# Patient Record
Sex: FEMALE | Race: WHITE | NOT HISPANIC OR LATINO | Employment: UNEMPLOYED | URBAN - METROPOLITAN AREA
[De-identification: names, ages, dates, MRNs, and addresses within clinical notes are randomized per-mention and may not be internally consistent; named-entity substitution may affect disease eponyms.]

---

## 2020-02-11 ENCOUNTER — APPOINTMENT (EMERGENCY)
Dept: RADIOLOGY | Facility: HOSPITAL | Age: 55
End: 2020-02-11

## 2020-02-11 LAB
ALBUMIN SERPL BCP-MCNC: 4.1 G/DL (ref 3.5–5)
ALP SERPL-CCNC: 104 U/L (ref 46–116)
ALT SERPL W P-5'-P-CCNC: 18 U/L (ref 12–78)
ANION GAP SERPL CALCULATED.3IONS-SCNC: 9 MMOL/L (ref 4–13)
AST SERPL W P-5'-P-CCNC: 12 U/L (ref 5–45)
BASOPHILS # BLD AUTO: 0.03 THOUSANDS/ΜL (ref 0–0.1)
BASOPHILS NFR BLD AUTO: 1 % (ref 0–1)
BILIRUB SERPL-MCNC: 0.5 MG/DL (ref 0.2–1)
BUN SERPL-MCNC: 15 MG/DL (ref 5–25)
CALCIUM SERPL-MCNC: 9.3 MG/DL (ref 8.3–10.1)
CHLORIDE SERPL-SCNC: 107 MMOL/L (ref 100–108)
CO2 SERPL-SCNC: 27 MMOL/L (ref 21–32)
CREAT SERPL-MCNC: 0.96 MG/DL (ref 0.6–1.3)
EOSINOPHIL # BLD AUTO: 0.06 THOUSAND/ΜL (ref 0–0.61)
EOSINOPHIL NFR BLD AUTO: 1 % (ref 0–6)
ERYTHROCYTE [DISTWIDTH] IN BLOOD BY AUTOMATED COUNT: 12.5 % (ref 11.6–15.1)
GFR SERPL CREATININE-BSD FRML MDRD: 67 ML/MIN/1.73SQ M
GLUCOSE SERPL-MCNC: 140 MG/DL (ref 65–140)
HCT VFR BLD AUTO: 38.5 % (ref 34.8–46.1)
HGB BLD-MCNC: 12.6 G/DL (ref 11.5–15.4)
IMM GRANULOCYTES # BLD AUTO: 0.01 THOUSAND/UL (ref 0–0.2)
IMM GRANULOCYTES NFR BLD AUTO: 0 % (ref 0–2)
LYMPHOCYTES # BLD AUTO: 1.47 THOUSANDS/ΜL (ref 0.6–4.47)
LYMPHOCYTES NFR BLD AUTO: 31 % (ref 14–44)
MCH RBC QN AUTO: 28.5 PG (ref 26.8–34.3)
MCHC RBC AUTO-ENTMCNC: 32.7 G/DL (ref 31.4–37.4)
MCV RBC AUTO: 87 FL (ref 82–98)
MONOCYTES # BLD AUTO: 0.39 THOUSAND/ΜL (ref 0.17–1.22)
MONOCYTES NFR BLD AUTO: 8 % (ref 4–12)
NEUTROPHILS # BLD AUTO: 2.84 THOUSANDS/ΜL (ref 1.85–7.62)
NEUTS SEG NFR BLD AUTO: 59 % (ref 43–75)
NRBC BLD AUTO-RTO: 0 /100 WBCS
PLATELET # BLD AUTO: 183 THOUSANDS/UL (ref 149–390)
PMV BLD AUTO: 9 FL (ref 8.9–12.7)
POTASSIUM SERPL-SCNC: 3.8 MMOL/L (ref 3.5–5.3)
PROT SERPL-MCNC: 7.6 G/DL (ref 6.4–8.2)
RBC # BLD AUTO: 4.42 MILLION/UL (ref 3.81–5.12)
SODIUM SERPL-SCNC: 143 MMOL/L (ref 136–145)
TROPONIN I SERPL-MCNC: <0.02 NG/ML
WBC # BLD AUTO: 4.8 THOUSAND/UL (ref 4.31–10.16)

## 2020-02-11 PROCEDURE — 84484 ASSAY OF TROPONIN QUANT: CPT | Performed by: EMERGENCY MEDICINE

## 2020-02-11 PROCEDURE — 85379 FIBRIN DEGRADATION QUANT: CPT | Performed by: EMERGENCY MEDICINE

## 2020-02-11 PROCEDURE — 36415 COLL VENOUS BLD VENIPUNCTURE: CPT

## 2020-02-11 PROCEDURE — 85025 COMPLETE CBC W/AUTO DIFF WBC: CPT | Performed by: EMERGENCY MEDICINE

## 2020-02-11 PROCEDURE — 99285 EMERGENCY DEPT VISIT HI MDM: CPT | Performed by: EMERGENCY MEDICINE

## 2020-02-11 PROCEDURE — 93005 ELECTROCARDIOGRAM TRACING: CPT

## 2020-02-11 PROCEDURE — 71046 X-RAY EXAM CHEST 2 VIEWS: CPT

## 2020-02-11 PROCEDURE — 99285 EMERGENCY DEPT VISIT HI MDM: CPT

## 2020-02-11 PROCEDURE — 80053 COMPREHEN METABOLIC PANEL: CPT | Performed by: EMERGENCY MEDICINE

## 2020-02-12 ENCOUNTER — HOSPITAL ENCOUNTER (EMERGENCY)
Facility: HOSPITAL | Age: 55
Discharge: HOME | End: 2020-02-12
Attending: EMERGENCY MEDICINE | Admitting: EMERGENCY MEDICINE

## 2020-02-12 VITALS
HEART RATE: 85 BPM | SYSTOLIC BLOOD PRESSURE: 111 MMHG | WEIGHT: 171.52 LBS | RESPIRATION RATE: 18 BRPM | BODY MASS INDEX: 26.92 KG/M2 | DIASTOLIC BLOOD PRESSURE: 71 MMHG | TEMPERATURE: 98.7 F | OXYGEN SATURATION: 97 % | HEIGHT: 67 IN

## 2020-02-12 DIAGNOSIS — R07.9 CHEST PAIN: Primary | ICD-10-CM

## 2020-02-12 LAB
ATRIAL RATE: 68 BPM
ATRIAL RATE: 82 BPM
D DIMER PPP FEU-MCNC: 0.44 UG/ML FEU
P AXIS: 20 DEGREES
P AXIS: 71 DEGREES
PR INTERVAL: 154 MS
PR INTERVAL: 168 MS
QRS AXIS: 59 DEGREES
QRS AXIS: 70 DEGREES
QRSD INTERVAL: 86 MS
QRSD INTERVAL: 86 MS
QT INTERVAL: 392 MS
QT INTERVAL: 416 MS
QTC INTERVAL: 442 MS
QTC INTERVAL: 457 MS
T WAVE AXIS: 70 DEGREES
T WAVE AXIS: 74 DEGREES
TROPONIN I SERPL-MCNC: <0.02 NG/ML
VENTRICULAR RATE: 68 BPM
VENTRICULAR RATE: 82 BPM

## 2020-02-12 PROCEDURE — 93010 ELECTROCARDIOGRAM REPORT: CPT | Performed by: INTERNAL MEDICINE

## 2020-02-12 PROCEDURE — 84484 ASSAY OF TROPONIN QUANT: CPT | Performed by: EMERGENCY MEDICINE

## 2020-02-12 PROCEDURE — 36415 COLL VENOUS BLD VENIPUNCTURE: CPT | Performed by: EMERGENCY MEDICINE

## 2020-02-12 PROCEDURE — 93005 ELECTROCARDIOGRAM TRACING: CPT

## 2020-02-12 NOTE — ED PROVIDER NOTES
History  Chief Complaint   Patient presents with    Chest Pain     pt [place] speaking, pts son in law informs us she was brought in for chest pain  pt checked her blood pressure at 2100 and it was high then at 2230 she developed chest pain     Patient speaks only [place]  I offered phone  and patient declined, preferring to use her brother-in-law as the  who is at bedside and speaks fluent [place]  HPI  47 y o  female presents with 4 hours of substernal chest pain with radiation to the back  Patient describes moderate pressure that came on after she had a "panic attack" and has improved since arrival at the ER  Patient states breathing worsens the pain and nothing improves the pain  Patient denies exertional component to the chest pain  [place] approximately 2 months ago  Patient was worried that her blood pressure had been elevated as she has a history of high blood pressure though she is normotensive upon arrival to the emergency room  Patient states she took her albuterol inhaler without improvement in the symptoms, she denies any dyspnea at the onset or currently  Patient associates chills without fever and nausea without vomiting, both of which have resolved; patient denies diaphoresis, dyspnea, cough, hemoptysis, weakness, dizziness, syncope, focal weakness or numbness, leg pain or swelling  All other review of systems reviewed and noted to be negative  The patient denies a history of atherosclerotic disease (CAD/TIA/CVA/PAD)  Patient affirms any history of hypertension  Patient denies hyperlipidemia  Patient denies diabetes  Patient denies obesity  Patient denies any early family history of CAD (male less than 49yo or female less than 71 yo)  Patient affirms any use of tobacco in the past 90 days  The patient denies any use of illicit drugs, including cocaine      Patient denies any immobilization of at least 3 days or surgery in the past 4 weeks  Patient denies any history of DVT or PE  Patient denies any history or family history of thrombophilia  Patient denies any malignancy with treatment within the past 6 months  Patient denies any use of exogenous estrogen containing compounds  Focused Objective  CV:  Normal inspection with no rash, signs of infection, or trauma  Regular rate and rhythm  No murmur  Peripheral pulses intact and equal   Nontender to palpitation over area of patient's reported pain  Respiratory:  Lungs clear to auscultation bilaterally without adventitious sounds  Skin:  Warm and dry  No rash or signs of herpes zoster over area of patient's reported pain  Extremities:  Non-tender lower extremities without asymmetry; no clinical signs of DVT  No lower extremity edema  Medical Decision Making    Patient presenting with chest pain with a broad differential, including multiple emergent etiologies  EKG obtained and reviewed independently by myself, which was interpreted by myself as normal sinus rhythm without acute ST segment changes  There is no prior to compare  Patient placed on cardiac monitoring  Regarding the possibility for ACS, patient's risk stratification by the HEART SCORE:    HEART score:    History 0=Slightly or non-suspicious  ECG 0=Normal  Age 1= > 45 - <65 years  Risk Factors 1= 1 or 2 risk factors  Troponin 0= < Normal limit  Total 2      Score 0-3: 1 7% had a MACE risk  0 4% (1 patient)   36 4% of patients were in this low risk group    Score 4-6: 16 6% had a MACE risk    Score 7-10: 50 1% had a MACE risk       The patient's HEART score is 2  CXR will be obtained to evaluate for alternative pathologies, including pneumothorax or pneumonia  Laboratory analysis including troponin to evaluate for ACS, CBC to evaluate for anemia or leukocytosis, and BMP to evaluate renal function and electrolytes considering possibility of cardiac involvement       Patient has symptoms and history concerning for possible pulmonary embolism  Regarding this etiology, patient's risk stratification by the Wells' Criteria for Pulmonary Embolism (Two Tier Model):  no - clinical signs or symptoms of DVT (3)  no - PE most likely diagnosis (3)  no - Heart rate greater than 100 (1 5)  no - Immobilization at least 3 days OR surgery in the previous 4 weeks (1 5)  no - Previous, objectively diagnosed PE or DVT (1 5)  no - Hemoptysis (1)  no - Malignancy with treatment within 6 months or palliative (1)    Patient's risk further evaluated by the [facility] Criteria for Pulmonary Embolism:  yes - age is greater than or equal to 50  no - Heart rate greater than 100  no - O2 sat on room air < 95%  no - Prior history of PE or DVT  no - Recent trauma or surgery  no - Hemoptysis  no - Use of exogenous estrogen  no - Unilateral leg swelling    Patient has a low risk Wells' criteria but is unable to be cleared via the [facility] criteria  As such, a D-Dimer will be ordered for the patient to evaluate for the potential for pulmonary embolism  If positive, CT imaging of the patient's chest will be obtained to evaluate for potential pulmonary embolism  The patient's HEART score Dorisbrandy Tavarez 2008) indicates a lower risk regarding the possibility of ACS  In accordance with the HEART pathway Luisito Orellana 2015), a second troponin will be obtained 3 hours after the initial troponin to evaluate whether the patient is safe for discharge from the emergency department for continued outpatient follow up  Patient on continuous cardiac monitoring and has been instructed to inform nursing with any change in the character or severity of their chest pain so repeat EKG can be obtained          History provided by:  Patient  Chest Pain   Pain location:  Substernal area  Pain quality: pressure    Pain radiates to:  Upper back  Pain severity:  Moderate  Onset quality:  Sudden  Timing:  Constant  Progression:  Partially resolved  Relieved by:  Nothing  Worsened by: Deep breathing  Associated symptoms: anxiety and nausea    Associated symptoms: no abdominal pain, no altered mental status, no anorexia, no back pain, no claudication, no cough, no diaphoresis, no dizziness, no dysphagia, no fatigue, no fever, no headache, no heartburn, no lower extremity edema, no near-syncope, no numbness, no orthopnea, no palpitations, no PND, no shortness of breath, no syncope, not vomiting and no weakness        None       Past Medical History:   Diagnosis Date    Hypertension     Panic attack        History reviewed  No pertinent surgical history  History reviewed  No pertinent family history  I have reviewed and agree with the history as documented  Social History     Tobacco Use    Smoking status: Current Every Day Smoker     Packs/day: 1 00     Types: Cigarettes    Smokeless tobacco: Never Used   Substance Use Topics    Alcohol use: Not Currently    Drug use: Never       Review of Systems   Constitutional: Negative for diaphoresis, fatigue and fever  HENT: Negative for trouble swallowing  Respiratory: Negative for cough and shortness of breath  Cardiovascular: Positive for chest pain  Negative for palpitations, orthopnea, claudication, syncope, PND and near-syncope  Gastrointestinal: Positive for nausea  Negative for abdominal pain, anorexia, heartburn and vomiting  Musculoskeletal: Negative for back pain  Neurological: Negative for dizziness, weakness, numbness and headaches  All other systems reviewed and are negative  Physical Exam  Physical Exam   Constitutional: She appears well-developed and well-nourished  HENT:   Head: Normocephalic and atraumatic  Mouth/Throat: Oropharynx is clear and moist    Eyes: Pupils are equal, round, and reactive to light  Neck: Normal range of motion  Neck supple  Cardiovascular: Normal rate and regular rhythm  Pulmonary/Chest: Effort normal and breath sounds normal  No stridor  No respiratory distress  Abdominal: Soft  Musculoskeletal:        Right lower leg: Normal  She exhibits no tenderness and no edema  Left lower leg: Normal  She exhibits no tenderness and no edema  Neurological: She is alert  Skin: Skin is warm and dry  Psychiatric: She has a normal mood and affect  Vitals reviewed        Vital Signs  ED Triage Vitals [02/11/20 2249]   Temperature Pulse Respirations Blood Pressure SpO2   98 7 °F (37 1 °C) 88 20 128/75 94 %      Temp Source Heart Rate Source Patient Position - Orthostatic VS BP Location FiO2 (%)   Oral Monitor Sitting Left arm --      Pain Score       --           Vitals:    02/11/20 2249 02/12/20 0124   BP: 128/75 111/71   Pulse: 88 85   Patient Position - Orthostatic VS: Sitting Lying         Visual Acuity      ED Medications  Medications - No data to display    Diagnostic Studies  Results Reviewed     Procedure Component Value Units Date/Time    Troponin I [[id number]]  (Normal) Collected:  02/12/20 0158    Lab Status:  Final result Specimen:  Blood from Arm, Right Updated:  02/12/20 0231     Troponin I <0 02 ng/mL     D-dimer, quantitative [[id number]]  (Normal) Collected:  02/11/20 2301    Lab Status:  Final result Specimen:  Blood from Arm, Left Updated:  02/12/20 0227     D-Dimer, Quant 0 44 ug/ml FEU     Comprehensive metabolic panel [[id number]] Collected:  02/11/20 2301    Lab Status:  Final result Specimen:  Blood from Arm, Left Updated:  02/11/20 2330     Sodium 143 mmol/L      Potassium 3 8 mmol/L      Chloride 107 mmol/L      CO2 27 mmol/L      ANION GAP 9 mmol/L      BUN 15 mg/dL      Creatinine 0 96 mg/dL      Glucose 140 mg/dL      Calcium 9 3 mg/dL      AST 12 U/L      ALT 18 U/L      Alkaline Phosphatase 104 U/L      Total Protein 7 6 g/dL      Albumin 4 1 g/dL      Total Bilirubin 0 50 mg/dL      eGFR 67 ml/min/1 73sq m     Narrative:       Eve guidelines for Chronic Kidney Disease (CKD):     Stage 1 with normal or high GFR (GFR > 90 mL/min/1 73 square meters)    Stage 2 Mild CKD (GFR = 60-89 mL/min/1 73 square meters)    Stage 3A Moderate CKD (GFR = 45-59 mL/min/1 73 square meters)    Stage 3B Moderate CKD (GFR = 30-44 mL/min/1 73 square meters)    Stage 4 Severe CKD (GFR = 15-29 mL/min/1 73 square meters)    Stage 5 End Stage CKD (GFR <15 mL/min/1 73 square meters)  Note: GFR calculation is accurate only with a steady state creatinine    Troponin I [[id number]]  (Normal) Collected:  02/11/20 2301    Lab Status:  Final result Specimen:  Blood from Arm, Left Updated:  02/11/20 2328     Troponin I <0 02 ng/mL     CBC and differential [[id number]] Collected:  02/11/20 2301    Lab Status:  Final result Specimen:  Blood from Arm, Left Updated:  02/11/20 2308     WBC 4 80 Thousand/uL      RBC 4 42 Million/uL      Hemoglobin 12 6 g/dL      Hematocrit 38 5 %      MCV 87 fL      MCH 28 5 pg      MCHC 32 7 g/dL      RDW 12 5 %      MPV 9 0 fL      Platelets 884 Thousands/uL      nRBC 0 /100 WBCs      Neutrophils Relative 59 %      Immat GRANS % 0 %      Lymphocytes Relative 31 %      Monocytes Relative 8 %      Eosinophils Relative 1 %      Basophils Relative 1 %      Neutrophils Absolute 2 84 Thousands/µL      Immature Grans Absolute 0 01 Thousand/uL      Lymphocytes Absolute 1 47 Thousands/µL      Monocytes Absolute 0 39 Thousand/µL      Eosinophils Absolute 0 06 Thousand/µL      Basophils Absolute 0 03 Thousands/µL                  XR chest 2 views   ED Interpretation by Regan Angelo MD (02/12 0242)   No acute findings  Procedures  Procedures         ED Course  ED Course as of Feb 12 0244   Wed Feb 12, 2020 0242 Patient's chest x-ray without acute findings  Patient's repeat EKG continues to be normal sinus rhythm with no acute ST segment changes  There is flattening of the T-wave in aVL but otherwise without findings  Patient's delta troponin negative  Discussed findings with the patient    Provided information on follow-up locally or discussed follow-up with primary care [place] when she returns  Discussed return precautions in detail  MDM      Disposition  Final diagnoses:   Chest pain     Time reflects when diagnosis was documented in both MDM as applicable and the Disposition within this note     Time User Action Codes Description Comment    2/12/2020  2:38 AM Leonora Escudero Add [R07 9] Chest pain       ED Disposition     ED Disposition Condition Date/Time Comment    Discharge Stable Wed Feb 12, 2020  2:38 AM Eleonora Conley discharge to home/self care  Follow-up Information     Follow up With Specialties Details Why Contact Info Additional Information    Infolink  Call  To discussed follow-up with local primary care if staying in the area  [address] [facility] Emergency Department Emergency Medicine Go to  If symptoms worsen [address] [facility] [address] ED, [address] [place], [place], [address]          Patient's Medications    No medications on file     No discharge procedures on file      PDMP Review     None          ED Provider  Electronically Signed by           Cedric Maharaj MD  02/12/20 1683